# Patient Record
Sex: MALE | Race: WHITE | HISPANIC OR LATINO | Employment: UNEMPLOYED | URBAN - METROPOLITAN AREA
[De-identification: names, ages, dates, MRNs, and addresses within clinical notes are randomized per-mention and may not be internally consistent; named-entity substitution may affect disease eponyms.]

---

## 2023-05-30 ENCOUNTER — OFFICE VISIT (OUTPATIENT)
Dept: SURGERY | Facility: CLINIC | Age: 23
End: 2023-05-30

## 2023-05-30 VITALS
DIASTOLIC BLOOD PRESSURE: 70 MMHG | HEIGHT: 72 IN | BODY MASS INDEX: 29.09 KG/M2 | WEIGHT: 214.8 LBS | SYSTOLIC BLOOD PRESSURE: 115 MMHG

## 2023-05-30 DIAGNOSIS — L05.01 PILONIDAL ABSCESS: Primary | ICD-10-CM

## 2023-05-30 PROCEDURE — 1159F MED LIST DOCD IN RCRD: CPT | Performed by: SURGERY

## 2023-05-30 PROCEDURE — 99203 OFFICE O/P NEW LOW 30 MIN: CPT | Performed by: SURGERY

## 2023-05-30 PROCEDURE — 1160F RVW MEDS BY RX/DR IN RCRD: CPT | Performed by: SURGERY

## 2023-05-30 RX ORDER — ACETAMINOPHEN 500 MG
500 TABLET ORAL EVERY 6 HOURS PRN
COMMUNITY

## 2023-05-30 RX ORDER — SULFAMETHOXAZOLE AND TRIMETHOPRIM 800; 160 MG/1; MG/1
1 TABLET ORAL 2 TIMES DAILY
COMMUNITY

## 2023-10-24 ENCOUNTER — OFFICE VISIT (OUTPATIENT)
Dept: SURGERY | Facility: CLINIC | Age: 23
End: 2023-10-24
Payer: COMMERCIAL

## 2023-10-24 VITALS
WEIGHT: 201.2 LBS | SYSTOLIC BLOOD PRESSURE: 115 MMHG | BODY MASS INDEX: 27.25 KG/M2 | DIASTOLIC BLOOD PRESSURE: 70 MMHG | HEIGHT: 72 IN

## 2023-10-24 DIAGNOSIS — M53.3 SACRAL PAIN: ICD-10-CM

## 2023-10-24 DIAGNOSIS — L05.91 PILONIDAL CYST: Primary | ICD-10-CM

## 2023-10-24 PROCEDURE — 1159F MED LIST DOCD IN RCRD: CPT | Performed by: SURGERY

## 2023-10-24 PROCEDURE — 1160F RVW MEDS BY RX/DR IN RCRD: CPT | Performed by: SURGERY

## 2023-10-24 PROCEDURE — 99213 OFFICE O/P EST LOW 20 MIN: CPT | Performed by: SURGERY

## 2023-10-24 NOTE — PROGRESS NOTES
Subjective   Es Camara is a 23 y.o. male who returns to the office for pain in the region of the pilonidal cyst.    History of Present Illness     The patient has a known history of a pilonidal cyst and developed a pilonidal abscess earlier this year.  He was seen on 5/30/2023 after drainage of the pilonidal abscess.  He had been seen in an urgent care center and was treated with Bactrim.  By the time he came to the office he had recovered well.  He now presents with a several week history of pain in the region of the sacrum that he attributes to the pilonidal cyst.  He has not had any drainage.  There has been no injury to the area.    He said he was riding roller coasters near the time of the pain but never had symptoms while riding.  He never had any known trauma.    Review of Systems   Constitutional:  Negative for fatigue and fever.   Respiratory:  Negative for chest tightness and shortness of breath.    Cardiovascular:  Negative for chest pain and palpitations.   Gastrointestinal:  Negative for abdominal pain, blood in stool, constipation, diarrhea, nausea and vomiting.     History reviewed. No pertinent past medical history.  Past Surgical History:   Procedure Laterality Date    WISDOM TOOTH EXTRACTION       Family History   Problem Relation Age of Onset    Hypertension Maternal Grandfather      Social History     Socioeconomic History    Marital status: Single   Tobacco Use    Smoking status: Never    Smokeless tobacco: Never   Vaping Use    Vaping Use: Former   Substance and Sexual Activity    Alcohol use: Yes     Alcohol/week: 1.0 standard drink of alcohol     Types: 1 Cans of beer per week    Drug use: Never    Sexual activity: Not Currently       Objective   Physical Exam  Constitutional:       Appearance: Normal appearance. He is well-developed. He is not toxic-appearing.   Eyes:      General: No scleral icterus.  Pulmonary:      Effort: Pulmonary effort is normal. No respiratory distress.   Skin:      General: Skin is warm and dry.      Comments: The area of the pilonidal cyst is clean with no evidence of infection.  There is 1 sinus tract but no palpable pilonidal cyst.   Neurological:      Mental Status: He is alert and oriented to person, place, and time.   Psychiatric:         Behavior: Behavior normal.         Thought Content: Thought content normal.         Judgment: Judgment normal.         Assessment & Plan     1.  Pilonidal cyst: The patient has a small pilonidal cyst noted only by the sinus tract.  There is no visible or palpable pilonidal cyst present.  This is not the source of his symptoms.    2.  Sacral pain: The patient has pain in the region of the sacrum and the coccyx.  There is no visible or palpable pathology noted.  He has no history of trauma.  He was offered a CT scan of the pelvis for further evaluation but would like to save money and avoid work-up if possible.  He was advised to use Arnica gel or an analgesic gel and a doughnut to sit on.  He will follow-up in 1 month to assess his symptoms.

## 2023-11-28 ENCOUNTER — OFFICE VISIT (OUTPATIENT)
Dept: SURGERY | Facility: CLINIC | Age: 23
End: 2023-11-28
Payer: COMMERCIAL

## 2023-11-28 VITALS
BODY MASS INDEX: 28.58 KG/M2 | WEIGHT: 211 LBS | SYSTOLIC BLOOD PRESSURE: 112 MMHG | DIASTOLIC BLOOD PRESSURE: 68 MMHG | HEIGHT: 72 IN

## 2023-11-28 DIAGNOSIS — M53.3 SACRAL PAIN: Primary | ICD-10-CM

## 2023-11-28 PROCEDURE — 1159F MED LIST DOCD IN RCRD: CPT | Performed by: SURGERY

## 2023-11-28 PROCEDURE — 99213 OFFICE O/P EST LOW 20 MIN: CPT | Performed by: SURGERY

## 2023-11-28 PROCEDURE — 1160F RVW MEDS BY RX/DR IN RCRD: CPT | Performed by: SURGERY

## 2023-11-28 NOTE — LETTER
November 28, 2023     Deandre Maxwell MD     Patient: Es Camara   YOB: 2000   Date of Visit: 11/28/2023     Dear Deandre Maxwell MD:       Thank you for referring Es Camara to me for evaluation. Below are the relevant portions of my assessment and plan of care.    If you have questions, please do not hesitate to call me. I look forward to following Es along with you.         Sincerely,        Kana Dow Jr., MD        CC:   No Recipients    Kana Dow Jr., MD  11/28/23 1653  Sign when Signing Visit  Subjective  Es Camara is a 23 y.o. male who returns to the office for sacral pain.    History of Present Illness     Patient has a previous history of a pilonidal cyst and was treated for pilonidal abscess on 5/30/2023 at which time an incision and drainage of the abscess was performed.  He recovered well after that procedure and has had no active disease involving his pilonidal cyst.     He was seen in the office on 10/24/2023 for pain in the region of his sacrum especially when sitting for prolonged period of time.  There is no clear etiology of the pain determined as his physical exam showed no active process involving the pilonidal cyst.  A CT scan of the pelvis was offered to him but he was advised that it would likely be a normal study.  He elected not to have the study to avoid the expense.  He was advised to use a doughnut pillow.    He now returns to the office with ongoing pain that he states is manageable.  He has pain when sitting for an extended period of time.  There has been no drainage no other abnormality.  When he palpates that area of the sacrum he has no pain.  He is able to be up and ambulating without difficulty.  There is no change in his bowel function.    Review of Systems   Constitutional:  Negative for fatigue and fever.   Respiratory:  Negative for chest tightness and shortness of breath.    Cardiovascular:  Negative for chest pain and palpitations.    Gastrointestinal:  Negative for abdominal pain, blood in stool, constipation, diarrhea, nausea and vomiting.     History reviewed. No pertinent past medical history.  Past Surgical History:   Procedure Laterality Date   • WISDOM TOOTH EXTRACTION       Family History   Problem Relation Age of Onset   • Hypertension Maternal Grandfather      Social History     Socioeconomic History   • Marital status: Single   Tobacco Use   • Smoking status: Never   • Smokeless tobacco: Never   Vaping Use   • Vaping Use: Former   Substance and Sexual Activity   • Alcohol use: Yes     Alcohol/week: 1.0 standard drink of alcohol     Types: 1 Cans of beer per week   • Drug use: Never   • Sexual activity: Not Currently       Objective  Physical Exam  Constitutional:       Appearance: Normal appearance. He is well-developed. He is not toxic-appearing.   Eyes:      General: No scleral icterus.  Pulmonary:      Effort: Pulmonary effort is normal. No respiratory distress.   Skin:     General: Skin is warm and dry.      Comments: The region of the sacrum is completely normal.  There is a sinus tract from a pilonidal cyst but no active disease nor palpable pilonidal cyst present.   Neurological:      Mental Status: He is alert and oriented to person, place, and time.   Psychiatric:         Behavior: Behavior normal.         Thought Content: Thought content normal.         Judgment: Judgment normal.         Assessment & Plan    1.  Sacral pain: The patient has pain in the region of the sacrum that does not appear to be related to a pilonidal cyst or any soft tissue process.  It raises concern that it may represent low back pain that is manifesting as sacral pain especially when he sits, based on the angle of his low back.  There is no general surgical process that can be identified.  I recommend he see an orthopedic spine surgeon if his symptoms continue.  He will contact his primary care physician, Dr. Maxwell, if a referral for an orthopedic  spine surgeon becomes necessary.

## 2023-11-28 NOTE — PROGRESS NOTES
Margie Camara is a 23 y.o. male who returns to the office for sacral pain.    History of Present Illness     Patient has a previous history of a pilonidal cyst and was treated for pilonidal abscess on 5/30/2023 at which time an incision and drainage of the abscess was performed.  He recovered well after that procedure and has had no active disease involving his pilonidal cyst.     He was seen in the office on 10/24/2023 for pain in the region of his sacrum especially when sitting for prolonged period of time.  There is no clear etiology of the pain determined as his physical exam showed no active process involving the pilonidal cyst.  A CT scan of the pelvis was offered to him but he was advised that it would likely be a normal study.  He elected not to have the study to avoid the expense.  He was advised to use a doughnut pillow.    He now returns to the office with ongoing pain that he states is manageable.  He has pain when sitting for an extended period of time.  There has been no drainage no other abnormality.  When he palpates that area of the sacrum he has no pain.  He is able to be up and ambulating without difficulty.  There is no change in his bowel function.    Review of Systems   Constitutional:  Negative for fatigue and fever.   Respiratory:  Negative for chest tightness and shortness of breath.    Cardiovascular:  Negative for chest pain and palpitations.   Gastrointestinal:  Negative for abdominal pain, blood in stool, constipation, diarrhea, nausea and vomiting.     History reviewed. No pertinent past medical history.  Past Surgical History:   Procedure Laterality Date    WISDOM TOOTH EXTRACTION       Family History   Problem Relation Age of Onset    Hypertension Maternal Grandfather      Social History     Socioeconomic History    Marital status: Single   Tobacco Use    Smoking status: Never    Smokeless tobacco: Never   Vaping Use    Vaping Use: Former   Substance and Sexual Activity     Alcohol use: Yes     Alcohol/week: 1.0 standard drink of alcohol     Types: 1 Cans of beer per week    Drug use: Never    Sexual activity: Not Currently       Objective   Physical Exam  Constitutional:       Appearance: Normal appearance. He is well-developed. He is not toxic-appearing.   Eyes:      General: No scleral icterus.  Pulmonary:      Effort: Pulmonary effort is normal. No respiratory distress.   Skin:     General: Skin is warm and dry.      Comments: The region of the sacrum is completely normal.  There is a sinus tract from a pilonidal cyst but no active disease nor palpable pilonidal cyst present.   Neurological:      Mental Status: He is alert and oriented to person, place, and time.   Psychiatric:         Behavior: Behavior normal.         Thought Content: Thought content normal.         Judgment: Judgment normal.         Assessment & Plan     1.  Sacral pain: The patient has pain in the region of the sacrum that does not appear to be related to a pilonidal cyst or any soft tissue process.  It raises concern that it may represent low back pain that is manifesting as sacral pain especially when he sits, based on the angle of his low back.  There is no general surgical process that can be identified.  I recommend he see an orthopedic spine surgeon if his symptoms continue.  He will contact his primary care physician, Dr. Maxwell, if a referral for an orthopedic spine surgeon becomes necessary.

## 2024-01-26 ENCOUNTER — OFFICE VISIT (OUTPATIENT)
Dept: SURGERY | Facility: CLINIC | Age: 24
End: 2024-01-26
Payer: COMMERCIAL

## 2024-01-26 VITALS
OXYGEN SATURATION: 99 % | HEART RATE: 109 BPM | DIASTOLIC BLOOD PRESSURE: 70 MMHG | SYSTOLIC BLOOD PRESSURE: 104 MMHG | WEIGHT: 208.9 LBS | BODY MASS INDEX: 28.3 KG/M2 | HEIGHT: 72 IN

## 2024-01-26 DIAGNOSIS — L05.91 PILONIDAL CYST: Primary | ICD-10-CM

## 2024-01-26 RX ORDER — SODIUM CHLORIDE 0.9 % (FLUSH) 0.9 %
10 SYRINGE (ML) INJECTION AS NEEDED
OUTPATIENT
Start: 2024-03-28

## 2024-01-26 RX ORDER — SODIUM CHLORIDE 9 MG/ML
40 INJECTION, SOLUTION INTRAVENOUS AS NEEDED
OUTPATIENT
Start: 2024-03-28

## 2024-01-26 RX ORDER — SODIUM CHLORIDE 0.9 % (FLUSH) 0.9 %
10 SYRINGE (ML) INJECTION EVERY 12 HOURS SCHEDULED
OUTPATIENT
Start: 2024-03-28

## 2024-01-26 NOTE — PROGRESS NOTES
Es Camara is a 23 y.o. male who is seen as a consult at the request of Self Referring for Cyst (Pilonidal ).      HPI:  The patient has a cyst on his gluteal cleft that has been present since 03/2023 or 04/2023. He initially felt what he believed was a bruise in the area of his coccyx. He experienced an episode of pain in this area that was so severe that he was bedridden for 1 week in 03/2023 or 04/2023. He presented to urgent care and his primary care provider. Drainage was unsuccessfully attempted. This area became infected and it ruptured, after which, his pain resolved. He was able to go on vacation and ride roller coasters. His pain recurred in 09/2023 and he has constant discomfort when he is sitting. He does not feel pain with standing or if he is lying prone. He denies an association of pain or pressure with bowel movements. He has not had a recurrence of infection.       Past Medical History:   Diagnosis Date    Allergic rhinitis     Cellulitis of buttock 05/2023    Dyslipidemia     Environmental allergies 11/05/2012    Lymphadenopathy 05/2022    Molluscum contagiosum 03/2011    BILATERAL LEGS    Pilonidal cyst 06/08/2023    TB lung, latent        Past Surgical History:   Procedure Laterality Date    EXTERNAL EAR SURGERY Right 2002    DRAINING AREA AT [facility], SX DONE IN [place]    WISDOM TOOTH EXTRACTION Bilateral        Social History:   reports that he has never smoked. He has never been exposed to tobacco smoke. He has never used smokeless tobacco. He reports current alcohol use of about 1.0 standard drink of alcohol per week. He reports that he does not use drugs.      Marriage status: Single    Family History   Problem Relation Age of Onset    Asthma Mother     Diabetes Maternal Grandfather     Heart disease Maternal Grandfather     Hypertension Maternal Grandfather     Heart attack Maternal Grandfather     Heart disease Paternal Grandfather        No current outpatient medications on  file.    Allergy  Patient has no known allergies.    Review of Systems   Constitutional: Negative for decreased appetite and weight gain.   HENT:  Positive for sore throat. Negative for congestion, hearing loss and hoarse voice.    Eyes:  Negative for blurred vision, discharge and visual disturbance.   Cardiovascular:  Negative for chest pain, cyanosis and leg swelling.   Respiratory:  Positive for cough. Negative for shortness of breath, sleep disturbances due to breathing and snoring.    Endocrine: Negative for cold intolerance and heat intolerance.   Hematologic/Lymphatic: Does not bruise/bleed easily.   Skin:  Negative for itching, poor wound healing and skin cancer.   Musculoskeletal:  Negative for arthritis, back pain, joint pain and joint swelling.   Gastrointestinal:  Negative for abdominal pain, change in bowel habit, bowel incontinence and constipation.   Genitourinary:  Negative for bladder incontinence, dysuria and hematuria.   Neurological:  Negative for brief paralysis, excessive daytime sleepiness, dizziness, focal weakness, headaches, light-headedness and weakness.   Psychiatric/Behavioral:  Negative for altered mental status and hallucinations. The patient does not have insomnia.    Allergic/Immunologic: Negative for HIV exposure and persistent infections.   All other systems reviewed and are negative.      Vitals:    01/26/24 1503   BP: 104/70   Pulse: 109   SpO2: 99%     Body mass index is 28.33 kg/m².    Physical Exam  Genitourinary:     Comments: On the gluteal cleft, the patient has 2 pits approximately 2 cm apart.          Review of Medical Record:  I reviewed medical records as detailed in HPI.     Assessment:  1. Pilonidal cyst        Plan:  - The patient was recommended a pilonidal cystectomy. He was informed of the typical surgical time, postop recovery including pain management, and restrictions.  I discussed with him that approximately 50% of patients have a wound that breaks open  because of the tension.  Those wounds would then have to be packed.  And this would cause a extended period of healing.  I discussed with patient risks, benefits, and alternatives. I discussed that approximately one third of patients experience a recurrence of the cyst. He had an opportunity to ask questions and all were answered. Patient understands and wishes to proceed with the procedure.      Transcribed from ambient dictation for Rebeca Kwan MD by Cristi Conway.  01/29/24  14:07 EST    Patient or patient representative verbalized consent to the visit recording.  I have personally performed the services described in this document as transcribed by the above individual, and it is both accurate and complete.

## 2024-03-28 ENCOUNTER — TELEPHONE (OUTPATIENT)
Dept: SURGERY | Facility: CLINIC | Age: 24
End: 2024-03-28

## 2024-03-28 ENCOUNTER — ANESTHESIA (OUTPATIENT)
Dept: PERIOP | Facility: HOSPITAL | Age: 24
End: 2024-03-28
Payer: COMMERCIAL

## 2024-03-28 ENCOUNTER — ANESTHESIA EVENT (OUTPATIENT)
Dept: PERIOP | Facility: HOSPITAL | Age: 24
End: 2024-03-28
Payer: COMMERCIAL

## 2024-03-28 ENCOUNTER — HOSPITAL ENCOUNTER (OUTPATIENT)
Facility: HOSPITAL | Age: 24
Setting detail: HOSPITAL OUTPATIENT SURGERY
Discharge: HOME | End: 2024-03-28
Attending: COLON & RECTAL SURGERY | Admitting: COLON & RECTAL SURGERY
Payer: COMMERCIAL

## 2024-03-28 VITALS
TEMPERATURE: 97.1 F | RESPIRATION RATE: 16 BRPM | OXYGEN SATURATION: 99 % | WEIGHT: 211.42 LBS | DIASTOLIC BLOOD PRESSURE: 56 MMHG | HEART RATE: 54 BPM | SYSTOLIC BLOOD PRESSURE: 106 MMHG | BODY MASS INDEX: 28.64 KG/M2 | HEIGHT: 72 IN

## 2024-03-28 DIAGNOSIS — L05.91 PILONIDAL CYST: ICD-10-CM

## 2024-03-28 PROCEDURE — 25010000002 CEFOXITIN PER 1 G: Performed by: COLON & RECTAL SURGERY

## 2024-03-28 PROCEDURE — 25010000002 ONDANSETRON PER 1 MG: Performed by: NURSE ANESTHETIST, CERTIFIED REGISTERED

## 2024-03-28 PROCEDURE — 25010000002 DEXAMETHASONE SODIUM PHOSPHATE 20 MG/5ML SOLUTION: Performed by: NURSE ANESTHETIST, CERTIFIED REGISTERED

## 2024-03-28 PROCEDURE — 88304 TISSUE EXAM BY PATHOLOGIST: CPT | Performed by: COLON & RECTAL SURGERY

## 2024-03-28 PROCEDURE — 25810000003 LACTATED RINGERS PER 1000 ML: Performed by: ANESTHESIOLOGY

## 2024-03-28 PROCEDURE — 25010000002 BUPIVACAINE (PF) 0.25 % SOLUTION 30 ML VIAL: Performed by: COLON & RECTAL SURGERY

## 2024-03-28 PROCEDURE — 25010000002 FENTANYL CITRATE (PF) 50 MCG/ML SOLUTION: Performed by: NURSE ANESTHETIST, CERTIFIED REGISTERED

## 2024-03-28 PROCEDURE — 11770 EXC PILONIDAL CYST SIMPLE: CPT | Performed by: COLON & RECTAL SURGERY

## 2024-03-28 PROCEDURE — 25010000002 PROPOFOL 10 MG/ML EMULSION: Performed by: NURSE ANESTHETIST, CERTIFIED REGISTERED

## 2024-03-28 PROCEDURE — 25010000002 SUGAMMADEX 200 MG/2ML SOLUTION: Performed by: NURSE ANESTHETIST, CERTIFIED REGISTERED

## 2024-03-28 PROCEDURE — 25010000002 KETOROLAC TROMETHAMINE PER 15 MG: Performed by: NURSE ANESTHETIST, CERTIFIED REGISTERED

## 2024-03-28 RX ORDER — SODIUM CHLORIDE 0.9 % (FLUSH) 0.9 %
10 SYRINGE (ML) INJECTION AS NEEDED
Status: DISCONTINUED | OUTPATIENT
Start: 2024-03-28 | End: 2024-03-28 | Stop reason: HOSPADM

## 2024-03-28 RX ORDER — DROPERIDOL 2.5 MG/ML
0.62 INJECTION, SOLUTION INTRAMUSCULAR; INTRAVENOUS
Status: DISCONTINUED | OUTPATIENT
Start: 2024-03-28 | End: 2024-03-28 | Stop reason: HOSPADM

## 2024-03-28 RX ORDER — PROPOFOL 10 MG/ML
VIAL (ML) INTRAVENOUS AS NEEDED
Status: DISCONTINUED | OUTPATIENT
Start: 2024-03-28 | End: 2024-03-28 | Stop reason: SURG

## 2024-03-28 RX ORDER — OXYCODONE AND ACETAMINOPHEN 7.5; 325 MG/1; MG/1
1 TABLET ORAL EVERY 4 HOURS PRN
Status: DISCONTINUED | OUTPATIENT
Start: 2024-03-28 | End: 2024-03-28 | Stop reason: HOSPADM

## 2024-03-28 RX ORDER — FLUMAZENIL 0.1 MG/ML
0.2 INJECTION INTRAVENOUS AS NEEDED
Status: DISCONTINUED | OUTPATIENT
Start: 2024-03-28 | End: 2024-03-28 | Stop reason: HOSPADM

## 2024-03-28 RX ORDER — ONDANSETRON 2 MG/ML
INJECTION INTRAMUSCULAR; INTRAVENOUS AS NEEDED
Status: DISCONTINUED | OUTPATIENT
Start: 2024-03-28 | End: 2024-03-28 | Stop reason: SURG

## 2024-03-28 RX ORDER — SODIUM CHLORIDE 0.9 % (FLUSH) 0.9 %
3-10 SYRINGE (ML) INJECTION AS NEEDED
Status: DISCONTINUED | OUTPATIENT
Start: 2024-03-28 | End: 2024-03-28 | Stop reason: HOSPADM

## 2024-03-28 RX ORDER — ONDANSETRON 4 MG/1
4 TABLET, ORALLY DISINTEGRATING ORAL ONCE AS NEEDED
Status: DISCONTINUED | OUTPATIENT
Start: 2024-03-28 | End: 2024-03-28 | Stop reason: HOSPADM

## 2024-03-28 RX ORDER — DEXAMETHASONE SODIUM PHOSPHATE 4 MG/ML
INJECTION, SOLUTION INTRA-ARTICULAR; INTRALESIONAL; INTRAMUSCULAR; INTRAVENOUS; SOFT TISSUE AS NEEDED
Status: DISCONTINUED | OUTPATIENT
Start: 2024-03-28 | End: 2024-03-28 | Stop reason: SURG

## 2024-03-28 RX ORDER — FENTANYL CITRATE 50 UG/ML
50 INJECTION, SOLUTION INTRAMUSCULAR; INTRAVENOUS
Status: DISCONTINUED | OUTPATIENT
Start: 2024-03-28 | End: 2024-03-28 | Stop reason: HOSPADM

## 2024-03-28 RX ORDER — PROMETHAZINE HYDROCHLORIDE 25 MG/1
25 TABLET ORAL ONCE AS NEEDED
Status: DISCONTINUED | OUTPATIENT
Start: 2024-03-28 | End: 2024-03-28 | Stop reason: HOSPADM

## 2024-03-28 RX ORDER — SODIUM CHLORIDE 0.9 % (FLUSH) 0.9 %
10 SYRINGE (ML) INJECTION EVERY 12 HOURS SCHEDULED
Status: DISCONTINUED | OUTPATIENT
Start: 2024-03-28 | End: 2024-03-28 | Stop reason: HOSPADM

## 2024-03-28 RX ORDER — EPHEDRINE SULFATE 50 MG/ML
5 INJECTION, SOLUTION INTRAVENOUS ONCE AS NEEDED
Status: DISCONTINUED | OUTPATIENT
Start: 2024-03-28 | End: 2024-03-28 | Stop reason: HOSPADM

## 2024-03-28 RX ORDER — KETOROLAC TROMETHAMINE 30 MG/ML
INJECTION, SOLUTION INTRAMUSCULAR; INTRAVENOUS AS NEEDED
Status: DISCONTINUED | OUTPATIENT
Start: 2024-03-28 | End: 2024-03-28 | Stop reason: SURG

## 2024-03-28 RX ORDER — HYDRALAZINE HYDROCHLORIDE 20 MG/ML
5 INJECTION INTRAMUSCULAR; INTRAVENOUS
Status: DISCONTINUED | OUTPATIENT
Start: 2024-03-28 | End: 2024-03-28 | Stop reason: HOSPADM

## 2024-03-28 RX ORDER — SODIUM CHLORIDE 9 MG/ML
40 INJECTION, SOLUTION INTRAVENOUS AS NEEDED
Status: DISCONTINUED | OUTPATIENT
Start: 2024-03-28 | End: 2024-03-28 | Stop reason: HOSPADM

## 2024-03-28 RX ORDER — POLYETHYLENE GLYCOL 3350 17 G/17G
17 POWDER, FOR SOLUTION ORAL 2 TIMES DAILY
Start: 2024-03-28

## 2024-03-28 RX ORDER — IPRATROPIUM BROMIDE AND ALBUTEROL SULFATE 2.5; .5 MG/3ML; MG/3ML
3 SOLUTION RESPIRATORY (INHALATION) ONCE AS NEEDED
Status: DISCONTINUED | OUTPATIENT
Start: 2024-03-28 | End: 2024-03-28 | Stop reason: HOSPADM

## 2024-03-28 RX ORDER — HYDROMORPHONE HYDROCHLORIDE 1 MG/ML
0.5 INJECTION, SOLUTION INTRAMUSCULAR; INTRAVENOUS; SUBCUTANEOUS
Status: DISCONTINUED | OUTPATIENT
Start: 2024-03-28 | End: 2024-03-28 | Stop reason: HOSPADM

## 2024-03-28 RX ORDER — HYDROCODONE BITARTRATE AND ACETAMINOPHEN 5; 325 MG/1; MG/1
1 TABLET ORAL ONCE AS NEEDED
Status: DISCONTINUED | OUTPATIENT
Start: 2024-03-28 | End: 2024-03-28 | Stop reason: HOSPADM

## 2024-03-28 RX ORDER — PROMETHAZINE HYDROCHLORIDE 25 MG/1
25 SUPPOSITORY RECTAL ONCE AS NEEDED
Status: DISCONTINUED | OUTPATIENT
Start: 2024-03-28 | End: 2024-03-28 | Stop reason: HOSPADM

## 2024-03-28 RX ORDER — LIDOCAINE HYDROCHLORIDE 20 MG/ML
INJECTION, SOLUTION INFILTRATION; PERINEURAL AS NEEDED
Status: DISCONTINUED | OUTPATIENT
Start: 2024-03-28 | End: 2024-03-28 | Stop reason: SURG

## 2024-03-28 RX ORDER — SODIUM CHLORIDE, SODIUM LACTATE, POTASSIUM CHLORIDE, CALCIUM CHLORIDE 600; 310; 30; 20 MG/100ML; MG/100ML; MG/100ML; MG/100ML
9 INJECTION, SOLUTION INTRAVENOUS CONTINUOUS
Status: DISCONTINUED | OUTPATIENT
Start: 2024-03-28 | End: 2024-03-28 | Stop reason: HOSPADM

## 2024-03-28 RX ORDER — FAMOTIDINE 10 MG/ML
20 INJECTION, SOLUTION INTRAVENOUS ONCE
Status: COMPLETED | OUTPATIENT
Start: 2024-03-28 | End: 2024-03-28

## 2024-03-28 RX ORDER — FENTANYL CITRATE 50 UG/ML
50 INJECTION, SOLUTION INTRAMUSCULAR; INTRAVENOUS ONCE AS NEEDED
Status: DISCONTINUED | OUTPATIENT
Start: 2024-03-28 | End: 2024-03-28 | Stop reason: HOSPADM

## 2024-03-28 RX ORDER — HYDROCODONE BITARTRATE AND ACETAMINOPHEN 5; 325 MG/1; MG/1
1-2 TABLET ORAL EVERY 6 HOURS PRN
Qty: 32 TABLET | Refills: 0 | Status: SHIPPED | OUTPATIENT
Start: 2024-03-28

## 2024-03-28 RX ORDER — FENTANYL CITRATE 50 UG/ML
INJECTION, SOLUTION INTRAMUSCULAR; INTRAVENOUS AS NEEDED
Status: DISCONTINUED | OUTPATIENT
Start: 2024-03-28 | End: 2024-03-28 | Stop reason: SURG

## 2024-03-28 RX ORDER — DIPHENHYDRAMINE HYDROCHLORIDE 50 MG/ML
12.5 INJECTION INTRAMUSCULAR; INTRAVENOUS
Status: DISCONTINUED | OUTPATIENT
Start: 2024-03-28 | End: 2024-03-28 | Stop reason: HOSPADM

## 2024-03-28 RX ORDER — ROCURONIUM BROMIDE 10 MG/ML
INJECTION, SOLUTION INTRAVENOUS AS NEEDED
Status: DISCONTINUED | OUTPATIENT
Start: 2024-03-28 | End: 2024-03-28 | Stop reason: SURG

## 2024-03-28 RX ORDER — MIDAZOLAM HYDROCHLORIDE 1 MG/ML
1 INJECTION INTRAMUSCULAR; INTRAVENOUS
Status: DISCONTINUED | OUTPATIENT
Start: 2024-03-28 | End: 2024-03-28 | Stop reason: HOSPADM

## 2024-03-28 RX ORDER — LIDOCAINE HYDROCHLORIDE 40 MG/ML
SOLUTION TOPICAL AS NEEDED
Status: DISCONTINUED | OUTPATIENT
Start: 2024-03-28 | End: 2024-03-28 | Stop reason: SURG

## 2024-03-28 RX ORDER — LIDOCAINE HYDROCHLORIDE 10 MG/ML
0.5 INJECTION, SOLUTION INFILTRATION; PERINEURAL ONCE AS NEEDED
Status: DISCONTINUED | OUTPATIENT
Start: 2024-03-28 | End: 2024-03-28 | Stop reason: HOSPADM

## 2024-03-28 RX ORDER — NALOXONE HCL 0.4 MG/ML
0.2 VIAL (ML) INJECTION AS NEEDED
Status: DISCONTINUED | OUTPATIENT
Start: 2024-03-28 | End: 2024-03-28 | Stop reason: HOSPADM

## 2024-03-28 RX ORDER — MAGNESIUM HYDROXIDE 1200 MG/15ML
LIQUID ORAL AS NEEDED
Status: DISCONTINUED | OUTPATIENT
Start: 2024-03-28 | End: 2024-03-28 | Stop reason: HOSPADM

## 2024-03-28 RX ORDER — LABETALOL HYDROCHLORIDE 5 MG/ML
5 INJECTION, SOLUTION INTRAVENOUS
Status: DISCONTINUED | OUTPATIENT
Start: 2024-03-28 | End: 2024-03-28 | Stop reason: HOSPADM

## 2024-03-28 RX ORDER — ONDANSETRON 2 MG/ML
4 INJECTION INTRAMUSCULAR; INTRAVENOUS ONCE AS NEEDED
Status: DISCONTINUED | OUTPATIENT
Start: 2024-03-28 | End: 2024-03-28 | Stop reason: HOSPADM

## 2024-03-28 RX ORDER — SODIUM CHLORIDE 0.9 % (FLUSH) 0.9 %
3 SYRINGE (ML) INJECTION EVERY 12 HOURS SCHEDULED
Status: DISCONTINUED | OUTPATIENT
Start: 2024-03-28 | End: 2024-03-28 | Stop reason: HOSPADM

## 2024-03-28 RX ADMIN — CEFOXITIN SODIUM 2000 MG: 2 POWDER, FOR SOLUTION INTRAVENOUS at 09:40

## 2024-03-28 RX ADMIN — LIDOCAINE HYDROCHLORIDE 100 MG: 20 INJECTION, SOLUTION INFILTRATION; PERINEURAL at 09:51

## 2024-03-28 RX ADMIN — KETOROLAC TROMETHAMINE 30 MG: 30 INJECTION, SOLUTION INTRAMUSCULAR; INTRAVENOUS at 10:08

## 2024-03-28 RX ADMIN — FAMOTIDINE 20 MG: 10 INJECTION INTRAVENOUS at 08:29

## 2024-03-28 RX ADMIN — FENTANYL CITRATE 50 MCG: 50 INJECTION, SOLUTION INTRAMUSCULAR; INTRAVENOUS at 09:50

## 2024-03-28 RX ADMIN — PROPOFOL 200 MG: 10 INJECTION, EMULSION INTRAVENOUS at 09:51

## 2024-03-28 RX ADMIN — ONDANSETRON 4 MG: 2 INJECTION INTRAMUSCULAR; INTRAVENOUS at 10:08

## 2024-03-28 RX ADMIN — SUGAMMADEX 200 MG: 100 INJECTION, SOLUTION INTRAVENOUS at 10:38

## 2024-03-28 RX ADMIN — SODIUM CHLORIDE, POTASSIUM CHLORIDE, SODIUM LACTATE AND CALCIUM CHLORIDE 9 ML/HR: 600; 310; 30; 20 INJECTION, SOLUTION INTRAVENOUS at 08:29

## 2024-03-28 RX ADMIN — ROCURONIUM BROMIDE 50 MG: 10 INJECTION, SOLUTION INTRAVENOUS at 09:51

## 2024-03-28 RX ADMIN — LIDOCAINE HYDROCHLORIDE 1 EACH: 40 SOLUTION TOPICAL at 09:52

## 2024-03-28 RX ADMIN — DEXAMETHASONE SODIUM PHOSPHATE 6 MG: 4 INJECTION, SOLUTION INTRAMUSCULAR; INTRAVENOUS at 09:59

## 2024-03-28 RX ADMIN — PROPOFOL 50 MG: 10 INJECTION, EMULSION INTRAVENOUS at 09:52

## 2024-03-28 NOTE — ANESTHESIA PREPROCEDURE EVALUATION
Anesthesia Evaluation     Patient summary reviewed and Nursing notes reviewed   NPO Solid Status: > 8 hours  NPO Liquid Status: > 2 hours           Airway   Mallampati: III  TM distance: >3 FB  Neck ROM: full  Possible difficult intubation  Comment: Full beard  Dental - normal exam     Pulmonary - normal exam    breath sounds clear to auscultation  Cardiovascular - normal exam    Rhythm: regular  Rate: normal        Neuro/Psych  GI/Hepatic/Renal/Endo      Musculoskeletal     Abdominal    Substance History   (+) drug use      Comment: Marijuana   OB/GYN          Other                      Anesthesia Plan    ASA 2     general     (GETA for prone position/may want CMAC available for intubation)  intravenous induction     Anesthetic plan, risks, benefits, and alternatives have been provided, discussed and informed consent has been obtained with: patient.    CODE STATUS:

## 2024-03-28 NOTE — TELEPHONE ENCOUNTER
Caller: RYAN HORAN    Relationship to patient: SELF    Best call back number: [phone] (home)       Patient is needing: SURGERY 3.28.24- DO I REMOVE DRESSING AFTER 24HR OR 48HRS?     IF 48HRS DO I SHOWER/ DRESSING ON?

## 2024-03-28 NOTE — H&P
Es Camara is a 23 y.o. male who is seen as a consult at the request of Self Referring for Cyst (Pilonidal ).       HPI:  The patient has a cyst on his gluteal cleft that has been present since 03/2023 or 04/2023. He initially felt what he believed was a bruise in the area of his coccyx. He experienced an episode of pain in this area that was so severe that he was bedridden for 1 week in 03/2023 or 04/2023. He presented to urgent care and his primary care provider. Drainage was unsuccessfully attempted. This area became infected and it ruptured, after which, his pain resolved. He was able to go on vacation and ride roller coasters. His pain recurred in 09/2023 and he has constant discomfort when he is sitting. He does not feel pain with standing or if he is lying prone. He denies an association of pain or pressure with bowel movements. He has not had a recurrence of infection.   The patient states that there is an area that started draining a few days ago that is new.          Medical History        Past Medical History:   Diagnosis Date    Allergic rhinitis      Cellulitis of buttock 05/2023    Dyslipidemia      Environmental allergies 11/05/2012    Lymphadenopathy 05/2022    Molluscum contagiosum 03/2011     BILATERAL LEGS    Pilonidal cyst 06/08/2023    TB lung, latent              Surgical History         Past Surgical History:   Procedure Laterality Date    EXTERNAL EAR SURGERY Right 2002     DRAINING AREA AT [facility], SX DONE IN [place]    WISDOM TOOTH EXTRACTION Bilateral              Social History:   reports that he has never smoked. He has never been exposed to tobacco smoke. He has never used smokeless tobacco. He reports current alcohol use of about 1.0 standard drink of alcohol per week. He reports that he does not use drugs.        Marriage status: Single           Family History   Problem Relation Age of Onset    Asthma Mother      Diabetes Maternal Grandfather      Heart disease Maternal Grandfather       Hypertension Maternal Grandfather      Heart attack Maternal Grandfather      Heart disease Paternal Grandfather           Current Medications   No current outpatient medications on file.        Allergy  Patient has no known allergies.     Review of Systems   Constitutional: Negative for decreased appetite and weight gain.   HENT:  Positive for sore throat. Negative for congestion, hearing loss and hoarse voice.    Eyes:  Negative for blurred vision, discharge and visual disturbance.   Cardiovascular:  Negative for chest pain, cyanosis and leg swelling.   Respiratory:  Positive for cough. Negative for shortness of breath, sleep disturbances due to breathing and snoring.    Endocrine: Negative for cold intolerance and heat intolerance.   Hematologic/Lymphatic: Does not bruise/bleed easily.   Skin:  Negative for itching, poor wound healing and skin cancer.   Musculoskeletal:  Negative for arthritis, back pain, joint pain and joint swelling.   Gastrointestinal:  Negative for abdominal pain, change in bowel habit, bowel incontinence and constipation.   Genitourinary:  Negative for bladder incontinence, dysuria and hematuria.   Neurological:  Negative for brief paralysis, excessive daytime sleepiness, dizziness, focal weakness, headaches, light-headedness and weakness.   Psychiatric/Behavioral:  Negative for altered mental status and hallucinations. The patient does not have insomnia.    Allergic/Immunologic: Negative for HIV exposure and persistent infections.   All other systems reviewed and are negative.            Vitals:     01/26/24 1503   BP: 104/70   Pulse: 109   SpO2: 99%      Body mass index is 28.33 kg/m².     Physical Exam  Genitourinary:     Comments: On the gluteal cleft, the patient has 2 pits approximately 2 cm apart.              Review of Medical Record:  I reviewed medical records as detailed in HPI.      Assessment:  1. Pilonidal cyst          Plan:  - The patient was recommended a pilonidal  cystectomy. He was informed of the typical surgical time, postop recovery including pain management, and restrictions.  I discussed with him that approximately 50% of patients have a wound that breaks open because of the tension.  Those wounds would then have to be packed.  And this would cause a extended period of healing.  I discussed with patient risks, benefits, and alternatives. I discussed that approximately one third of patients experience a recurrence of the cyst. He had an opportunity to ask questions and all were answered. Patient understands and wishes to proceed with the procedure.

## 2024-03-28 NOTE — ANESTHESIA PROCEDURE NOTES
Airway  Urgency: elective    Date/Time: 3/28/2024 9:52 AM  Airway not difficult    General Information and Staff    Patient location during procedure: OR  Anesthesiologist: Abraham Andre MD  CRNA/CAA: Raf Thomas CRNA    Indications and Patient Condition  Indications for airway management: airway protection    Preoxygenated: yes  MILS maintained throughout  Mask difficulty assessment: 2 - vent by mask + OA or adjuvant +/- NMBA    Final Airway Details  Final airway type: endotracheal airway      Successful airway: ETT  Cuffed: yes   Successful intubation technique: direct laryngoscopy  Facilitating devices/methods: Bougie and cricoid pressure  Blade: Jameson  Blade size: 4  ETT size (mm): 7.5  Cormack-Lehane Classification: grade IIa - partial view of glottis  Placement verified by: chest auscultation and capnometry   Measured from: teeth  ETT/EBT  to teeth (cm): 22  Number of attempts at approach: 1  Assessment: lips, teeth, and gum same as pre-op and atraumatic intubation

## 2024-03-29 LAB
LAB AP CASE REPORT: NORMAL
PATH REPORT.FINAL DX SPEC: NORMAL
PATH REPORT.GROSS SPEC: NORMAL

## 2024-04-12 ENCOUNTER — OFFICE VISIT (OUTPATIENT)
Dept: SURGERY | Facility: CLINIC | Age: 24
End: 2024-04-12
Payer: COMMERCIAL

## 2024-04-12 VITALS
OXYGEN SATURATION: 98 % | SYSTOLIC BLOOD PRESSURE: 108 MMHG | HEIGHT: 72 IN | WEIGHT: 210.8 LBS | BODY MASS INDEX: 28.55 KG/M2 | DIASTOLIC BLOOD PRESSURE: 70 MMHG | HEART RATE: 100 BPM

## 2024-04-12 DIAGNOSIS — L05.91 PILONIDAL CYST: Primary | ICD-10-CM

## 2024-04-12 PROCEDURE — 99024 POSTOP FOLLOW-UP VISIT: CPT | Performed by: PHYSICIAN ASSISTANT

## 2024-04-12 NOTE — PROGRESS NOTES
"Es Camara is a 23 y.o. male in for follow up of pilonidal cyst status post pilonidal cystectomy on 3/28/2024.    Just went back to work () Tuesday.  Noticed a small amount of blood or fluid from the incision yesterday.  Had some pain yesterday but felt better today.  Sitting to drive continues to be uncomfortable.    /70 (BP Location: Right arm, Patient Position: Sitting, Cuff Size: Adult)   Pulse 100   Ht 182.9 cm (72\")   Wt 95.6 kg (210 lb 12.8 oz)   SpO2 98%   BMI 28.59 kg/m²   Body mass index is 28.59 kg/m².  -  Physical Exam  No acute distress  Chaperone present  Skin/perianal: Well-healing incision without erythema, drainage, or other sign of infection.  4 sutures in place.    Assessment:   1. Pilonidal cyst    - status post pilonidal cystectomy on 3/28/2024    Plan:  Removed all 4 sutures today  Discussed with patient that discomfort should continue to improve gradually over time, and he should be symptom-free by 8 weeks postop.  Call with any questions or concerns.  Follow-up as needed    Elly Millan PA-C  Physician Assistant  Colorectal Surgery    "

## (undated) DEVICE — SPNG GZ WOVN 4X4IN 12PLY 10/BX STRL

## (undated) DEVICE — HYPODERMIC SAFETY NEEDLE: Brand: MONOJECT

## (undated) DEVICE — SPNG LAP 18X18IN LF STRL PK/5

## (undated) DEVICE — PREMIUM WET SKIN PREP TRAY: Brand: MEDLINE INDUSTRIES, INC.

## (undated) DEVICE — GOWN SURG AERO CHROME XL

## (undated) DEVICE — PATIENT RETURN ELECTRODE, SINGLE-USE, CONTACT QUALITY MONITORING, ADULT, WITH 9FT CORD, FOR PATIENTS WEIGING OVER 33LBS. (15KG): Brand: MEGADYNE

## (undated) DEVICE — STERILE LATEX POWDER-FREE SURGICAL GLOVESWITH NITRILE COATING: Brand: PROTEXIS

## (undated) DEVICE — SMOKE EVACUATION TUBING WITH 8 IN INTEGRAL WAND AND SPONGE GUARD: Brand: BUFFALO FILTER

## (undated) DEVICE — PENCL ES MEGADINE EZ/CLEAN BUTN W/HOLSTR 10FT

## (undated) DEVICE — LOU MINOR PROCEDURE: Brand: MEDLINE INDUSTRIES, INC.

## (undated) DEVICE — DRAPE,UTILITY,TAPE,15X26,STERILE: Brand: MEDLINE

## (undated) DEVICE — ANTIBACTERIAL UNDYED BRAIDED (POLYGLACTIN 910), SYNTHETIC ABSORBABLE SUTURE: Brand: COATED VICRYL

## (undated) DEVICE — GLV SURG SENSICARE PI LF PF 7.5 GRN STRL

## (undated) DEVICE — GOWN,SIRUS,NON REINFRCD,LARGE,SET IN SL: Brand: MEDLINE

## (undated) DEVICE — PREP SOL POVIDONE/IODINE BT 4OZ

## (undated) DEVICE — SUT VIC 0 CT2 CR8 18IN DYED J727D